# Patient Record
Sex: FEMALE | Race: WHITE | NOT HISPANIC OR LATINO | Employment: OTHER | RURAL
[De-identification: names, ages, dates, MRNs, and addresses within clinical notes are randomized per-mention and may not be internally consistent; named-entity substitution may affect disease eponyms.]

---

## 2023-03-14 ENCOUNTER — OFFICE VISIT (OUTPATIENT)
Dept: FAMILY MEDICINE CLINIC | Facility: CLINIC | Age: 63
End: 2023-03-14
Payer: OTHER GOVERNMENT

## 2023-03-14 VITALS
SYSTOLIC BLOOD PRESSURE: 128 MMHG | BODY MASS INDEX: 23.21 KG/M2 | HEIGHT: 63 IN | HEART RATE: 75 BPM | DIASTOLIC BLOOD PRESSURE: 74 MMHG | OXYGEN SATURATION: 99 % | WEIGHT: 131 LBS

## 2023-03-14 DIAGNOSIS — Z63.4 BEREAVEMENT: ICD-10-CM

## 2023-03-14 DIAGNOSIS — N95.2 POST-MENOPAUSAL ATROPHIC VAGINITIS: ICD-10-CM

## 2023-03-14 DIAGNOSIS — S51.012S LACERATION OF LEFT ELBOW, SEQUELA: ICD-10-CM

## 2023-03-14 DIAGNOSIS — Z12.31 ENCOUNTER FOR SCREENING MAMMOGRAM FOR MALIGNANT NEOPLASM OF BREAST: ICD-10-CM

## 2023-03-14 DIAGNOSIS — Z00.00 WELL ADULT EXAM: Primary | ICD-10-CM

## 2023-03-14 PROCEDURE — 99386 PREV VISIT NEW AGE 40-64: CPT | Performed by: STUDENT IN AN ORGANIZED HEALTH CARE EDUCATION/TRAINING PROGRAM

## 2023-03-14 PROCEDURE — 15853 REMOVAL SUTR/STAPL XREQ ANES: CPT | Performed by: STUDENT IN AN ORGANIZED HEALTH CARE EDUCATION/TRAINING PROGRAM

## 2023-03-14 RX ORDER — ESTRADIOL 10 UG/1
1 INSERT VAGINAL 2 TIMES WEEKLY
Qty: 8 TABLET | Refills: 3 | Status: SHIPPED | OUTPATIENT
Start: 2023-03-16

## 2023-03-14 RX ORDER — ESTRADIOL 10 UG/1
1 INSERT VAGINAL 2 TIMES WEEKLY
COMMUNITY
End: 2023-03-14 | Stop reason: SDUPTHER

## 2023-03-14 SDOH — SOCIAL STABILITY - SOCIAL INSECURITY: DISSAPEARANCE AND DEATH OF FAMILY MEMBER: Z63.4

## 2023-03-14 NOTE — PROGRESS NOTES
"Chief Complaint  Establish Care (Just moved from [place])    Subjective          Sandee Aguilar presents to [facility]  History of Present Illness    Patient is here to establish care.     She states that she is currently only taking the vagifen and se is almost out of these refills. She states that she tolerates this medication well and has not had any side effects from it.     She states that she has been told that she possibly had trouble with her cholesterol based on her eye exam. She is due for blood work at this time.     She states that she was seen in the emergency department about 1 week ago while at the power was out she was walking through her barn, and hit her elbow causing a laceration that required stitches in order to close.  She states she is up-to-date on her tetanus vaccine.    She is due for mammogram at this time.  She is also due for blood work at this time.  She recently had colon cancer screening and was negative and told to follow-up in 10 years.    Objective   Vital Signs:   /74 (BP Location: Right arm, Patient Position: Sitting, Cuff Size: Adult)   Pulse 75   Ht 160 cm (63\")   Wt 59.4 kg (131 lb)   SpO2 99%   BMI 23.21 kg/m²     Body mass index is 23.21 kg/m².    Review of Systems    Past History:  Medical History: has no past medical history on file.   Surgical History: has a past surgical history that includes Dilation and curettage of uterus; Breast biopsy; and Vascular surgery.   Family History: family history is not on file.   Social History: reports that she has never smoked. She has never used smokeless tobacco. She reports current alcohol use. Drug use questions deferred to the physician.      Current Outpatient Medications:   •  [START ON 3/16/2023] estradiol (VAGIFEM) 10 MCG tablet vaginal tablet, Insert 1 tablet into the vagina 2 (Two) Times a Week., Disp: 8 tablet, Rfl: 3    Allergies: Sulfa antibiotics    Physical " Exam  Constitutional:       General: She is not in acute distress.     Appearance: She is not ill-appearing or toxic-appearing.   HENT:      Head: Normocephalic and atraumatic.   Cardiovascular:      Rate and Rhythm: Normal rate and regular rhythm.      Heart sounds: No murmur heard.  Pulmonary:      Effort: Pulmonary effort is normal. No respiratory distress.   Skin:     Comments: Laceration on left elbow healing well with good approximation.  No erythema or drainage from the area.   Neurological:      General: No focal deficit present.      Mental Status: She is alert and oriented to person, place, and time.   Psychiatric:         Mood and Affect: Mood normal.         Thought Content: Thought content normal.          Result Review :          Suture Removal    Date/Time: 3/14/2023 12:39 PM  Performed by: Cece Finch DO  Authorized by: Cece Finch DO   Body area: upper extremity  Location details: left elbow  Wound Appearance: clean  Sutures Removed: 3  Staples Removed: 0  Post-removal: dressing applied  Patient tolerance: patient tolerated the procedure well with no immediate complications              Assessment and Plan    Diagnoses and all orders for this visit:    1. Well adult exam (Primary)  -     Comprehensive Metabolic Panel; Future  -     CBC & Differential; Future  -     Hemoglobin A1c; Future  -     Lipid Panel; Future  -     TSH; Future  -     T4, Free; Future    2. Laceration of left elbow, sequela  -     Suture Removal    3. Bereavement    4. Encounter for screening mammogram for malignant neoplasm of breast  -     Mammo Screening Bilateral With CAD; Future    5. Post-menopausal atrophic vaginitis    Other orders  -     estradiol (VAGIFEM) 10 MCG tablet vaginal tablet; Insert 1 tablet into the vagina 2 (Two) Times a Week.  Dispense: 8 tablet; Refill: 3    Blood work ordered and will contact with results when available. Will adjust medications based on abnormalities seen.     We  will obtain patient's previous records and review.    Mammogram ordered.    Information provided for bereavement as she has had some difficulty with mood and anxiousness after her father recently passed away.    Past medical and surgical history as well as allergies, family history and social history were reviewed, and discussed with patient.  Chronic conditions were reviewed as well as medications.   Anticipatory guidance handouts including healthy diet, health maintenance, as well as regular exercise and general instructions were given via [employer], and patient was able to ask questions and discuss any concerns.        Follow Up   No follow-ups on file.  Patient was given instructions and counseling regarding her condition or for health maintenance advice. Please see specific information pulled into the AVS if appropriate.     Cece Finch, DO

## 2023-05-09 RX ORDER — ESTRADIOL 10 UG/1
INSERT VAGINAL
Qty: 25 TABLET | Refills: 3 | Status: SHIPPED | OUTPATIENT
Start: 2023-05-09

## 2023-05-09 NOTE — TELEPHONE ENCOUNTER
Rx Refill Note  Requested Prescriptions     Pending Prescriptions Disp Refills    estradiol (VAGIFEM) 10 MCG tablet vaginal tablet [Pharmacy Med Name: ESTRADIOL 10MCG VAGINAL TABS 8S] 25 tablet      Sig: INSERT 1 TABLET VAGINALLY 2 TIMES A WEEK      Last office visit with prescribing clinician: 3/14/2023   Last telemedicine visit with prescribing clinician: 3/14/2023   Next office visit with prescribing clinician: Visit date not found                         Would you like a call back once the refill request has been completed: [] Yes [] No    If the office needs to give you a call back, can they leave a voicemail: [] Yes [] No    Virginia Glynn MA  05/09/23, 09:18 EDT

## 2023-05-11 ENCOUNTER — LAB (OUTPATIENT)
Dept: FAMILY MEDICINE CLINIC | Facility: CLINIC | Age: 63
End: 2023-05-11
Payer: OTHER GOVERNMENT

## 2023-05-11 DIAGNOSIS — Z00.00 WELL ADULT EXAM: ICD-10-CM

## 2023-05-11 PROCEDURE — 36415 COLL VENOUS BLD VENIPUNCTURE: CPT | Performed by: STUDENT IN AN ORGANIZED HEALTH CARE EDUCATION/TRAINING PROGRAM

## 2023-05-12 LAB
ALBUMIN SERPL-MCNC: 4.9 G/DL (ref 3.8–4.8)
ALBUMIN/GLOB SERPL: 2.3 {RATIO} (ref 1.2–2.2)
ALP SERPL-CCNC: 66 IU/L (ref 44–121)
ALT SERPL-CCNC: 18 IU/L (ref 0–32)
AST SERPL-CCNC: 24 IU/L (ref 0–40)
BASOPHILS # BLD AUTO: 0.1 X10E3/UL (ref 0–0.2)
BASOPHILS NFR BLD AUTO: 1 %
BILIRUB SERPL-MCNC: 0.9 MG/DL (ref 0–1.2)
BUN SERPL-MCNC: 12 MG/DL (ref 8–27)
BUN/CREAT SERPL: 14 (ref 12–28)
CALCIUM SERPL-MCNC: 9.7 MG/DL (ref 8.7–10.3)
CHLORIDE SERPL-SCNC: 104 MMOL/L (ref 96–106)
CHOLEST SERPL-MCNC: 240 MG/DL (ref 100–199)
CO2 SERPL-SCNC: 24 MMOL/L (ref 20–29)
CREAT SERPL-MCNC: 0.84 MG/DL (ref 0.57–1)
EGFRCR SERPLBLD CKD-EPI 2021: 79 ML/MIN/1.73
EOSINOPHIL # BLD AUTO: 0 X10E3/UL (ref 0–0.4)
EOSINOPHIL NFR BLD AUTO: 1 %
ERYTHROCYTE [DISTWIDTH] IN BLOOD BY AUTOMATED COUNT: 12.9 % (ref 11.7–15.4)
GLOBULIN SER CALC-MCNC: 2.1 G/DL (ref 1.5–4.5)
GLUCOSE SERPL-MCNC: 89 MG/DL (ref 70–99)
HBA1C MFR BLD: 5.2 % (ref 4.8–5.6)
HCT VFR BLD AUTO: 41.6 % (ref 34–46.6)
HDLC SERPL-MCNC: 80 MG/DL
HGB BLD-MCNC: 14.3 G/DL (ref 11.1–15.9)
IMM GRANULOCYTES # BLD AUTO: 0 X10E3/UL (ref 0–0.1)
IMM GRANULOCYTES NFR BLD AUTO: 0 %
LDLC SERPL CALC-MCNC: 144 MG/DL (ref 0–99)
LYMPHOCYTES # BLD AUTO: 1.2 X10E3/UL (ref 0.7–3.1)
LYMPHOCYTES NFR BLD AUTO: 27 %
MCH RBC QN AUTO: 29.5 PG (ref 26.6–33)
MCHC RBC AUTO-ENTMCNC: 34.4 G/DL (ref 31.5–35.7)
MCV RBC AUTO: 86 FL (ref 79–97)
MONOCYTES # BLD AUTO: 0.5 X10E3/UL (ref 0.1–0.9)
MONOCYTES NFR BLD AUTO: 11 %
NEUTROPHILS # BLD AUTO: 2.8 X10E3/UL (ref 1.4–7)
NEUTROPHILS NFR BLD AUTO: 60 %
PLATELET # BLD AUTO: 246 X10E3/UL (ref 150–450)
POTASSIUM SERPL-SCNC: 4.6 MMOL/L (ref 3.5–5.2)
PROT SERPL-MCNC: 7 G/DL (ref 6–8.5)
RBC # BLD AUTO: 4.85 X10E6/UL (ref 3.77–5.28)
SODIUM SERPL-SCNC: 142 MMOL/L (ref 134–144)
T4 FREE SERPL-MCNC: 1.05 NG/DL (ref 0.82–1.77)
TRIGL SERPL-MCNC: 95 MG/DL (ref 0–149)
TSH SERPL DL<=0.005 MIU/L-ACNC: 4.19 UIU/ML (ref 0.45–4.5)
VLDLC SERPL CALC-MCNC: 16 MG/DL (ref 5–40)
WBC # BLD AUTO: 4.5 X10E3/UL (ref 3.4–10.8)

## 2023-06-05 RX ORDER — ESTRADIOL 10 UG/1
INSERT VAGINAL
Qty: 24 TABLET | OUTPATIENT
Start: 2023-06-05

## 2024-07-24 RX ORDER — ESTRADIOL 10 UG/1
1 TABLET VAGINAL 2 TIMES WEEKLY
Qty: 25 TABLET | Refills: 3 | OUTPATIENT
Start: 2024-07-25

## 2024-08-07 ENCOUNTER — OFFICE VISIT (OUTPATIENT)
Dept: FAMILY MEDICINE CLINIC | Facility: CLINIC | Age: 64
End: 2024-08-07
Payer: OTHER GOVERNMENT

## 2024-08-07 VITALS
HEIGHT: 63 IN | WEIGHT: 126.5 LBS | BODY MASS INDEX: 22.41 KG/M2 | DIASTOLIC BLOOD PRESSURE: 68 MMHG | SYSTOLIC BLOOD PRESSURE: 110 MMHG | OXYGEN SATURATION: 97 % | HEART RATE: 72 BPM

## 2024-08-07 DIAGNOSIS — M85.80 OSTEOPENIA, UNSPECIFIED LOCATION: ICD-10-CM

## 2024-08-07 DIAGNOSIS — Z00.00 WELL ADULT EXAM: Primary | ICD-10-CM

## 2024-08-07 DIAGNOSIS — Z11.59 ENCOUNTER FOR HEPATITIS C SCREENING TEST FOR LOW RISK PATIENT: ICD-10-CM

## 2024-08-07 DIAGNOSIS — K21.9 GASTROESOPHAGEAL REFLUX DISEASE, UNSPECIFIED WHETHER ESOPHAGITIS PRESENT: ICD-10-CM

## 2024-08-07 DIAGNOSIS — D22.9 IRRITATED NEVUS: ICD-10-CM

## 2024-08-07 DIAGNOSIS — E78.2 MIXED HYPERLIPIDEMIA: ICD-10-CM

## 2024-08-07 DIAGNOSIS — Z12.31 ENCOUNTER FOR SCREENING MAMMOGRAM FOR MALIGNANT NEOPLASM OF BREAST: ICD-10-CM

## 2024-08-07 DIAGNOSIS — Z78.0 POST-MENOPAUSAL: ICD-10-CM

## 2024-08-07 PROCEDURE — 99396 PREV VISIT EST AGE 40-64: CPT | Performed by: STUDENT IN AN ORGANIZED HEALTH CARE EDUCATION/TRAINING PROGRAM

## 2024-08-07 RX ORDER — FAMOTIDINE 40 MG/1
40 TABLET, FILM COATED ORAL DAILY
Qty: 90 TABLET | Refills: 1 | Status: SHIPPED | OUTPATIENT
Start: 2024-08-07

## 2024-08-07 NOTE — PROGRESS NOTES
"Chief Complaint  Annual Exam    Subjective          Sandee Aguilar presents to [facility]  History of Present Illness    Patient is here for physical.     She states overall she is doing very well at this time.  She states she has been tolerating her medications without any side effects, but is not needing refills today.  She states that she has had some increased reflux, and has not tried anything to help with this    Patient is needing new referral to dermatology through [facility] for irritated nevus as the place she was referred to previously is out of network.    Patient is due for blood work at this time.    Objective   Vital Signs:   /68 (BP Location: Left arm, Patient Position: Sitting, Cuff Size: Adult)   Pulse 72   Ht 160 cm (63\")   Wt 57.4 kg (126 lb 8 oz)   SpO2 97%   BMI 22.41 kg/m²     Body mass index is 22.41 kg/m².    Review of Systems    Past History:  Medical History: has a past medical history of Colon polyp (2021).   Surgical History: has a past surgical history that includes Dilation and curettage of uterus; Breast biopsy; Vascular surgery; and Colonoscopy (2022).   Family History: family history includes Alcohol abuse in her sister and son; Miscarriages / Stillbirths in her daughter and daughter.   Social History: reports that she has never smoked. She has never used smokeless tobacco. She reports current alcohol use of about 1.0 standard drink of alcohol per week. She reports that she does not use drugs.      Current Outpatient Medications:     estradiol (VAGIFEM) 10 MCG tablet vaginal tablet, Insert 1 tablet into the vagina 2 (Two) Times a Week., Disp: 25 tablet, Rfl: 3    famotidine (PEPCID) 40 MG tablet, Take 1 tablet by mouth Daily., Disp: 90 tablet, Rfl: 1    Allergies: Sulfa antibiotics    Physical Exam  Constitutional:       General: She is not in acute distress.     Appearance: She is not ill-appearing or toxic-appearing.   HENT:      Head: " Normocephalic and atraumatic.   Cardiovascular:      Rate and Rhythm: Normal rate and regular rhythm.      Heart sounds: No murmur heard.  Pulmonary:      Effort: Pulmonary effort is normal. No respiratory distress.   Musculoskeletal:      Right lower leg: No edema.      Left lower leg: No edema.   Skin:     Comments: Irritated nevus on right lateral chest wall   Neurological:      General: No focal deficit present.      Mental Status: She is alert and oriented to person, place, and time.   Psychiatric:         Mood and Affect: Mood normal.         Thought Content: Thought content normal.          Result Review :                   Assessment and Plan    Diagnoses and all orders for this visit:    1. Well adult exam (Primary)  -     Comprehensive Metabolic Panel  -     CBC & Differential  -     Hemoglobin A1c  -     Lipid Panel  -     TSH  -     T4, Free    2. Osteopenia, unspecified location  -     DEXA Bone Density Axial    3. Post-menopausal  -     DEXA Bone Density Axial    4. Encounter for hepatitis C screening test for low risk patient  -     Hepatitis C Antibody    5. Irritated nevus  -     Ambulatory Referral to Dermatology    6. Encounter for screening mammogram for malignant neoplasm of breast  -     Mammo Screening Bilateral With CAD; Future    7. Gastroesophageal reflux disease, unspecified whether esophagitis present    Other orders  -     famotidine (PEPCID) 40 MG tablet; Take 1 tablet by mouth Daily.  Dispense: 90 tablet; Refill: 1    Blood work ordered and will contact with results when available. Will adjust medications based on abnormalities seen.     Will do famotidine to take daily as needed for reflux.    DEXA and mammogram ordered at this time.    Past medical and surgical history as well as allergies, family history and social history were reviewed, and discussed with patient.  Chronic conditions were reviewed as well as medications.   Anticipatory guidance handouts including healthy diet,  health maintenance, as well as regular exercise and general instructions were given via [employer], and patient was able to ask questions and discuss any concerns.        Follow Up   No follow-ups on file.  Patient was given instructions and counseling regarding her condition or for health maintenance advice. Please see specific information pulled into the AVS if appropriate.     Cece Finch, DO

## 2024-08-08 LAB
ALBUMIN SERPL-MCNC: 4.7 G/DL (ref 3.9–4.9)
ALP SERPL-CCNC: 69 IU/L (ref 44–121)
ALT SERPL-CCNC: 12 IU/L (ref 0–32)
AST SERPL-CCNC: 17 IU/L (ref 0–40)
BASOPHILS # BLD AUTO: 0.1 X10E3/UL (ref 0–0.2)
BASOPHILS NFR BLD AUTO: 1 %
BILIRUB SERPL-MCNC: 0.7 MG/DL (ref 0–1.2)
BUN SERPL-MCNC: 14 MG/DL (ref 8–27)
BUN/CREAT SERPL: 17 (ref 12–28)
CALCIUM SERPL-MCNC: 9.8 MG/DL (ref 8.7–10.3)
CHLORIDE SERPL-SCNC: 104 MMOL/L (ref 96–106)
CHOLEST SERPL-MCNC: 243 MG/DL (ref 100–199)
CO2 SERPL-SCNC: 24 MMOL/L (ref 20–29)
CREAT SERPL-MCNC: 0.82 MG/DL (ref 0.57–1)
EGFRCR SERPLBLD CKD-EPI 2021: 80 ML/MIN/1.73
EOSINOPHIL # BLD AUTO: 0 X10E3/UL (ref 0–0.4)
EOSINOPHIL NFR BLD AUTO: 0 %
ERYTHROCYTE [DISTWIDTH] IN BLOOD BY AUTOMATED COUNT: 13.5 % (ref 11.7–15.4)
GLOBULIN SER CALC-MCNC: 2.1 G/DL (ref 1.5–4.5)
GLUCOSE SERPL-MCNC: 91 MG/DL (ref 70–99)
HBA1C MFR BLD: 5.6 % (ref 4.8–5.6)
HCT VFR BLD AUTO: 43.3 % (ref 34–46.6)
HCV IGG SERPL QL IA: NON REACTIVE
HDLC SERPL-MCNC: 63 MG/DL
HGB BLD-MCNC: 13.9 G/DL (ref 11.1–15.9)
IMM GRANULOCYTES # BLD AUTO: 0 X10E3/UL (ref 0–0.1)
IMM GRANULOCYTES NFR BLD AUTO: 0 %
LDLC SERPL CALC-MCNC: 155 MG/DL (ref 0–99)
LYMPHOCYTES # BLD AUTO: 1.5 X10E3/UL (ref 0.7–3.1)
LYMPHOCYTES NFR BLD AUTO: 25 %
MCH RBC QN AUTO: 28.8 PG (ref 26.6–33)
MCHC RBC AUTO-ENTMCNC: 32.1 G/DL (ref 31.5–35.7)
MCV RBC AUTO: 90 FL (ref 79–97)
MONOCYTES # BLD AUTO: 0.5 X10E3/UL (ref 0.1–0.9)
MONOCYTES NFR BLD AUTO: 9 %
NEUTROPHILS # BLD AUTO: 3.9 X10E3/UL (ref 1.4–7)
NEUTROPHILS NFR BLD AUTO: 65 %
PLATELET # BLD AUTO: 272 X10E3/UL (ref 150–450)
POTASSIUM SERPL-SCNC: 4.2 MMOL/L (ref 3.5–5.2)
PROT SERPL-MCNC: 6.8 G/DL (ref 6–8.5)
RBC # BLD AUTO: 4.83 X10E6/UL (ref 3.77–5.28)
SODIUM SERPL-SCNC: 141 MMOL/L (ref 134–144)
T4 FREE SERPL-MCNC: 0.98 NG/DL (ref 0.82–1.77)
TRIGL SERPL-MCNC: 140 MG/DL (ref 0–149)
TSH SERPL DL<=0.005 MIU/L-ACNC: 3.95 UIU/ML (ref 0.45–4.5)
VLDLC SERPL CALC-MCNC: 25 MG/DL (ref 5–40)
WBC # BLD AUTO: 6 X10E3/UL (ref 3.4–10.8)

## 2024-08-09 ENCOUNTER — TELEPHONE (OUTPATIENT)
Dept: FAMILY MEDICINE CLINIC | Facility: CLINIC | Age: 64
End: 2024-08-09
Payer: OTHER GOVERNMENT

## 2024-08-09 NOTE — TELEPHONE ENCOUNTER
Patient was contacted to schedule lab appointment in 6 months, and scheduled the appointment for 2/10/2025. Please place the orders.

## 2024-08-16 RX ORDER — ESTRADIOL 10 UG/1
1 INSERT VAGINAL 2 TIMES WEEKLY
Qty: 25 TABLET | Refills: 3 | Status: SHIPPED | OUTPATIENT
Start: 2024-08-19

## 2024-08-16 NOTE — TELEPHONE ENCOUNTER
Rx Refill Note  Requested Prescriptions     Pending Prescriptions Disp Refills    estradiol (VAGIFEM) 10 MCG tablet vaginal tablet 25 tablet 3     Sig: Insert 1 tablet into the vagina 2 (Two) Times a Week.      Last office visit with prescribing clinician: 8/7/2024   Last telemedicine visit with prescribing clinician: Visit date not found   Next office visit with prescribing clinician: Visit date not found                         Would you like a call back once the refill request has been completed: [] Yes [] No    If the office needs to give you a call back, can they leave a voicemail: [] Yes [] No    Virginia Glynn MA  08/16/24, 14:59 EDT

## 2024-08-16 NOTE — TELEPHONE ENCOUNTER
Caller: Sandee Aguilar    Relationship: Self    Best call back number: [phone]     Requested Prescriptions:   Requested Prescriptions     Pending Prescriptions Disp Refills    estradiol (VAGIFEM) 10 MCG tablet vaginal tablet 25 tablet 3     Sig: Insert 1 tablet into the vagina 2 (Two) Times a Week.        Pharmacy where request should be sent: [facility] [address] [phone] [facility] [phone]      Last office visit with prescribing clinician: 8/7/2024   Last telemedicine visit with prescribing clinician: Visit date not found   Next office visit with prescribing clinician: Visit date not found     Does the patient have less than a 3 day supply:  [] Yes  [x] No    Would you like a call back once the refill request has been completed: [] Yes [] No    If the office needs to give you a call back, can they leave a voicemail: [] Yes [] No    Larry Pink   08/16/24 13:36 EDT

## 2024-12-05 DIAGNOSIS — Z12.31 ENCOUNTER FOR SCREENING MAMMOGRAM FOR MALIGNANT NEOPLASM OF BREAST: ICD-10-CM

## 2024-12-06 ENCOUNTER — TELEPHONE (OUTPATIENT)
Dept: FAMILY MEDICINE CLINIC | Facility: CLINIC | Age: 64
End: 2024-12-06

## 2024-12-06 RX ORDER — ALENDRONATE SODIUM 70 MG/1
70 TABLET ORAL
Qty: 12 TABLET | Refills: 1 | Status: SHIPPED | OUTPATIENT
Start: 2024-12-06 | End: 2024-12-30

## 2024-12-06 NOTE — TELEPHONE ENCOUNTER
Caller: Sandee Aguilar    Relationship: Self    Best call back number: [phone]     What is the best time to reach you: ANYTIME    Who are you requesting to speak with (clinical staff, provider,  specific staff member): CLINICAL STAFF    Do you know the name of the person who called: AMILCAR    What was the call regarding: PATIENT STATES THAT SHE RECEIVED THE RESULTS OF HER MAMMOGRAM AND BONE DENSITY BACK AFTER HAVING PERFORMED THEM AT [facility] ON 12.3.24    PATIENT IS ANTICIPATING A CALL FROM THE OFFICE TO GO OVER THE RESULTS AND NEXT STEPS    PLEASE ADVISE

## 2024-12-06 NOTE — TELEPHONE ENCOUNTER
Name: Sandee Aguilar      Relationship: Self      Best Callback Number: [phone]       HUB PROVIDED THE RELAY MESSAGE FROM THE OFFICE      PATIENT: VOICED UNDERSTANDING AND HAS NO FURTHER QUESTIONS AT THIS TIME    ADDITIONAL INFORMATION: PT DOES WANT TO START ON FOSAMAX [facility] - [address] - [phone]  - [phone]  [phone]     PT STATED SHE IS TAKING A MULTI-VITAMIN THAT HAS VITAMIN D AND CALCIUM AND WANTS TO KNOW IT THAT IS ENOUGH.    PT WOULD ALSO LIKE A CALL ABOUT HER MAMMOGRAM RESULTS.

## 2024-12-06 NOTE — TELEPHONE ENCOUNTER
[facility]    HUB TO RELAY    Cece Finch DO [facility]  Let patient know that her DEXA scan shows osteoporosis which is significant thinning down the bones.  While I do recommend that she take a vitamin D and calcium supplements she also likely needs something like Fosamax in order to help build the bones back up.  She is okay with starting something like this let me know and I will send it in.

## 2024-12-07 DIAGNOSIS — R92.8 ABNORMAL MAMMOGRAM: Primary | ICD-10-CM

## 2024-12-30 ENCOUNTER — OFFICE VISIT (OUTPATIENT)
Dept: FAMILY MEDICINE CLINIC | Facility: CLINIC | Age: 64
End: 2024-12-30
Payer: OTHER GOVERNMENT

## 2024-12-30 VITALS
HEIGHT: 63 IN | SYSTOLIC BLOOD PRESSURE: 140 MMHG | BODY MASS INDEX: 23.21 KG/M2 | DIASTOLIC BLOOD PRESSURE: 82 MMHG | WEIGHT: 131 LBS | OXYGEN SATURATION: 98 % | HEART RATE: 86 BPM

## 2024-12-30 DIAGNOSIS — Z78.0 POST-MENOPAUSAL: ICD-10-CM

## 2024-12-30 DIAGNOSIS — M85.80 OSTEOPENIA, UNSPECIFIED LOCATION: Primary | ICD-10-CM

## 2024-12-30 DIAGNOSIS — E55.9 VITAMIN D DEFICIENCY: ICD-10-CM

## 2024-12-30 DIAGNOSIS — R53.83 OTHER FATIGUE: ICD-10-CM

## 2024-12-30 DIAGNOSIS — K21.9 GASTROESOPHAGEAL REFLUX DISEASE, UNSPECIFIED WHETHER ESOPHAGITIS PRESENT: ICD-10-CM

## 2024-12-30 DIAGNOSIS — E78.2 MIXED HYPERLIPIDEMIA: ICD-10-CM

## 2024-12-30 PROCEDURE — 99214 OFFICE O/P EST MOD 30 MIN: CPT | Performed by: STUDENT IN AN ORGANIZED HEALTH CARE EDUCATION/TRAINING PROGRAM

## 2024-12-30 NOTE — PROGRESS NOTES
"Chief Complaint  Follow-up (Pt wants to talk about her osteoporosis and hormone replacement. She also wants labs to check for autoimmune diseases.)    Subjective          Sandee Aguilar presents to [facility]  History of Present Illness    Patient is here for follow up and discussion of autoimmune disease.     She states that she has been having trouble with her \"burning tongue syndrome\" worse over the past several months. And she has been more fatigued and feeling \"off\". She states that she has FH of JRA, and Hashimoto's disease. She sates that she has been told that she has been borderline thyroid issues in the past.     Objective   Vital Signs:   /82   Pulse 86   Ht 160 cm (63\")   Wt 59.4 kg (131 lb)   SpO2 98%   BMI 23.21 kg/m²     Body mass index is 23.21 kg/m².    Review of Systems    Past History:  Medical History: has a past medical history of Colon polyp (2021) and Headache.   Surgical History: has a past surgical history that includes Dilation and curettage of uterus; Breast biopsy; Vascular surgery; and Colonoscopy (2022).   Family History: family history includes Alcohol abuse in her sister and son; Miscarriages / Stillbirths in her daughter and daughter.   Social History: reports that she has never smoked. She has never used smokeless tobacco. She reports current alcohol use of about 1.0 standard drink of alcohol per week. She reports that she does not use drugs.      Current Outpatient Medications:     estradiol (VAGIFEM) 10 MCG tablet vaginal tablet, Insert 1 tablet into the vagina 2 (Two) Times a Week., Disp: 25 tablet, Rfl: 3    famotidine (PEPCID) 40 MG tablet, Take 1 tablet by mouth Daily., Disp: 90 tablet, Rfl: 1    Allergies: Sulfa antibiotics    Physical Exam  Constitutional:       General: She is not in acute distress.     Appearance: She is not ill-appearing or toxic-appearing.   HENT:      Head: Normocephalic and atraumatic.   Cardiovascular: "      Rate and Rhythm: Normal rate and regular rhythm.      Heart sounds: No murmur heard.  Pulmonary:      Effort: Pulmonary effort is normal. No respiratory distress.   Abdominal:      Tenderness: There is no abdominal tenderness. There is no guarding or rebound.   Neurological:      General: No focal deficit present.      Mental Status: She is alert and oriented to person, place, and time.   Psychiatric:         Mood and Affect: Mood normal.         Thought Content: Thought content normal.          Result Review :                   Assessment and Plan    Diagnoses and all orders for this visit:    1. Osteopenia, unspecified location (Primary)  -     TSH  -     T4, Free    2. Post-menopausal  -     Comprehensive Metabolic Panel  -     CBC & Differential    3. Other fatigue  -     Comprehensive Metabolic Panel  -     CBC & Differential  -     Thyroid Peroxidase Antibody  -     Rheumatoid Factor  -     Sedimentation Rate  -     C-reactive Protein  -     BLAYNE    4. Mixed hyperlipidemia  -     Lipid Panel    5. Gastroesophageal reflux disease, unspecified whether esophagitis present  -     TSH  -     T4, Free    6. Vitamin D deficiency  -     Vitamin D,25-Hydroxy    Discussed with patient that currently hormonal placement is off the table until we have negative test from her upcoming diagnostic mammogram.  She is agreeable to this.    Blood work ordered today and will contact patient with results when available.     Discussed that we will discuss HRT after diagnostic mammogram'    Follow Up   No follow-ups on file.  Patient was given instructions and counseling regarding her condition or for health maintenance advice. Please see specific information pulled into the AVS if appropriate.     Cece Finch, DO

## 2024-12-31 LAB
25(OH)D3+25(OH)D2 SERPL-MCNC: 33.2 NG/ML (ref 30–100)
ALBUMIN SERPL-MCNC: 4.8 G/DL (ref 3.9–4.9)
ALP SERPL-CCNC: 71 IU/L (ref 44–121)
ALT SERPL-CCNC: 18 IU/L (ref 0–32)
ANA SER QL: NEGATIVE
AST SERPL-CCNC: 16 IU/L (ref 0–40)
BASOPHILS # BLD AUTO: 0.1 X10E3/UL (ref 0–0.2)
BASOPHILS NFR BLD AUTO: 2 %
BILIRUB SERPL-MCNC: 0.4 MG/DL (ref 0–1.2)
BUN SERPL-MCNC: 21 MG/DL (ref 8–27)
BUN/CREAT SERPL: 28 (ref 12–28)
CALCIUM SERPL-MCNC: 9.6 MG/DL (ref 8.7–10.3)
CHLORIDE SERPL-SCNC: 103 MMOL/L (ref 96–106)
CHOLEST SERPL-MCNC: 261 MG/DL (ref 100–199)
CO2 SERPL-SCNC: 26 MMOL/L (ref 20–29)
CREAT SERPL-MCNC: 0.76 MG/DL (ref 0.57–1)
CRP SERPL-MCNC: 1 MG/L (ref 0–10)
EGFRCR SERPLBLD CKD-EPI 2021: 88 ML/MIN/1.73
EOSINOPHIL # BLD AUTO: 0 X10E3/UL (ref 0–0.4)
EOSINOPHIL NFR BLD AUTO: 1 %
ERYTHROCYTE [DISTWIDTH] IN BLOOD BY AUTOMATED COUNT: 12.5 % (ref 11.7–15.4)
ERYTHROCYTE [SEDIMENTATION RATE] IN BLOOD BY WESTERGREN METHOD: 4 MM/HR (ref 0–40)
GLOBULIN SER CALC-MCNC: 2.2 G/DL (ref 1.5–4.5)
GLUCOSE SERPL-MCNC: 91 MG/DL (ref 70–99)
HCT VFR BLD AUTO: 42.6 % (ref 34–46.6)
HDLC SERPL-MCNC: 67 MG/DL
HGB BLD-MCNC: 13.5 G/DL (ref 11.1–15.9)
IMM GRANULOCYTES # BLD AUTO: 0 X10E3/UL (ref 0–0.1)
IMM GRANULOCYTES NFR BLD AUTO: 0 %
LDLC SERPL CALC-MCNC: 176 MG/DL (ref 0–99)
LYMPHOCYTES # BLD AUTO: 1.5 X10E3/UL (ref 0.7–3.1)
LYMPHOCYTES NFR BLD AUTO: 27 %
MCH RBC QN AUTO: 28.7 PG (ref 26.6–33)
MCHC RBC AUTO-ENTMCNC: 31.7 G/DL (ref 31.5–35.7)
MCV RBC AUTO: 91 FL (ref 79–97)
MONOCYTES # BLD AUTO: 0.5 X10E3/UL (ref 0.1–0.9)
MONOCYTES NFR BLD AUTO: 8 %
NEUTROPHILS # BLD AUTO: 3.4 X10E3/UL (ref 1.4–7)
NEUTROPHILS NFR BLD AUTO: 62 %
PLATELET # BLD AUTO: 271 X10E3/UL (ref 150–450)
POTASSIUM SERPL-SCNC: 4.2 MMOL/L (ref 3.5–5.2)
PROT SERPL-MCNC: 7 G/DL (ref 6–8.5)
RBC # BLD AUTO: 4.7 X10E6/UL (ref 3.77–5.28)
RHEUMATOID FACT SERPL-ACNC: 10.5 IU/ML
SODIUM SERPL-SCNC: 142 MMOL/L (ref 134–144)
T4 FREE SERPL-MCNC: 1.02 NG/DL (ref 0.82–1.77)
THYROPEROXIDASE AB SERPL-ACNC: 97 IU/ML (ref 0–34)
TRIGL SERPL-MCNC: 104 MG/DL (ref 0–149)
TSH SERPL DL<=0.005 MIU/L-ACNC: 3.45 UIU/ML (ref 0.45–4.5)
VLDLC SERPL CALC-MCNC: 18 MG/DL (ref 5–40)
WBC # BLD AUTO: 5.4 X10E3/UL (ref 3.4–10.8)

## 2025-01-02 ENCOUNTER — TELEPHONE (OUTPATIENT)
Dept: FAMILY MEDICINE CLINIC | Facility: CLINIC | Age: 65
End: 2025-01-02
Payer: OTHER GOVERNMENT

## 2025-01-02 DIAGNOSIS — R76.8 ANTI-TPO ANTIBODIES PRESENT: Primary | ICD-10-CM

## 2025-01-02 NOTE — TELEPHONE ENCOUNTER
Patient called to let Dr. Finch know that she would like to proceed with the endocrinology referral. Can Dr. Finch please place the referral?

## 2025-01-08 ENCOUNTER — OFFICE VISIT (OUTPATIENT)
Dept: FAMILY MEDICINE CLINIC | Facility: CLINIC | Age: 65
End: 2025-01-08
Payer: OTHER GOVERNMENT

## 2025-01-08 VITALS
HEART RATE: 97 BPM | DIASTOLIC BLOOD PRESSURE: 80 MMHG | HEIGHT: 63 IN | OXYGEN SATURATION: 99 % | SYSTOLIC BLOOD PRESSURE: 138 MMHG | WEIGHT: 126 LBS | BODY MASS INDEX: 22.32 KG/M2

## 2025-01-08 DIAGNOSIS — J01.10 ACUTE NON-RECURRENT FRONTAL SINUSITIS: Primary | ICD-10-CM

## 2025-01-08 PROCEDURE — 99213 OFFICE O/P EST LOW 20 MIN: CPT | Performed by: STUDENT IN AN ORGANIZED HEALTH CARE EDUCATION/TRAINING PROGRAM

## 2025-01-08 NOTE — PROGRESS NOTES
"Chief Complaint  URI (Cough, nasal congestion x1 week)    Mariela Aguilar presents to [facility]  URI   This is a new problem. The current episode started 1 to 4 weeks ago. The problem has been gradually worsening. There has been no fever. Associated symptoms include congestion, coughing, headaches, a plugged ear sensation, rhinorrhea, sinus pain and a sore throat. Pertinent negatives include no nausea, swollen glands or wheezing. She has tried acetaminophen, antihistamine and NSAIDs for the symptoms. The treatment provided mild relief.   No known sick contacts.     Objective   Vital Signs:   /80   Pulse 97   Ht 160 cm (63\")   Wt 57.2 kg (126 lb)   SpO2 99%   BMI 22.32 kg/m²     Body mass index is 22.32 kg/m².    Review of Systems   HENT:  Positive for congestion, rhinorrhea and sore throat. Negative for swollen glands.    Respiratory:  Positive for cough. Negative for wheezing.    Gastrointestinal:  Negative for nausea.       Past History:  Medical History: has a past medical history of Colon polyp (2021) and Headache.   Surgical History: has a past surgical history that includes Dilation and curettage of uterus; Breast biopsy; Vascular surgery; and Colonoscopy (2022).   Family History: family history includes Alcohol abuse in her sister and son; Miscarriages / Stillbirths in her daughter and daughter.   Social History: reports that she has never smoked. She has never used smokeless tobacco. She reports current alcohol use of about 1.0 standard drink of alcohol per week. She reports that she does not use drugs.      Current Outpatient Medications:     amoxicillin-clavulanate (AUGMENTIN) 875-125 MG per tablet, Take 1 tablet by mouth 2 (Two) Times a Day., Disp: 14 tablet, Rfl: 0    estradiol (VAGIFEM) 10 MCG tablet vaginal tablet, Insert 1 tablet into the vagina 2 (Two) Times a Week., Disp: 25 tablet, Rfl: 3    famotidine (PEPCID) 40 MG tablet, Take 1 " tablet by mouth Daily., Disp: 90 tablet, Rfl: 1    Allergies: Sulfa antibiotics    Physical Exam  Constitutional:       General: She is not in acute distress.     Appearance: She is not ill-appearing or toxic-appearing.   HENT:      Head: Normocephalic and atraumatic.      Right Ear: Ear canal and external ear normal.      Left Ear: Ear canal and external ear normal.      Nose: Congestion and rhinorrhea present.      Mouth/Throat:      Pharynx: Posterior oropharyngeal erythema (cobblestoning) present.   Eyes:      General: No scleral icterus.  Cardiovascular:      Rate and Rhythm: Normal rate and regular rhythm.   Pulmonary:      Effort: Pulmonary effort is normal.      Breath sounds: Normal breath sounds.   Neurological:      Mental Status: She is alert.          Result Review :                   Assessment and Plan    Diagnoses and all orders for this visit:    1. Acute non-recurrent frontal sinusitis (Primary)    Other orders  -     amoxicillin-clavulanate (AUGMENTIN) 875-125 MG per tablet; Take 1 tablet by mouth 2 (Two) Times a Day.  Dispense: 14 tablet; Refill: 0    Will treat with Augmentin. Tylenol/Motrin for pain/fever. OTC cough and cold medication for symptoms. Nasal saline spray recommended. Cool mist humidifier at the bedside. RTC with new or worsening symptoms.      Follow Up   No follow-ups on file.  Patient was given instructions and counseling regarding her condition or for health maintenance advice. Please see specific information pulled into the AVS if appropriate.     Cece Finch,

## 2025-01-22 ENCOUNTER — TELEPHONE (OUTPATIENT)
Dept: FAMILY MEDICINE CLINIC | Facility: CLINIC | Age: 65
End: 2025-01-22
Payer: OTHER GOVERNMENT

## 2025-01-22 RX ORDER — OSELTAMIVIR PHOSPHATE 75 MG/1
75 CAPSULE ORAL DAILY
Qty: 7 CAPSULE | Refills: 0 | Status: SHIPPED | OUTPATIENT
Start: 2025-01-22

## 2025-01-22 NOTE — TELEPHONE ENCOUNTER
Caller: Sandee Aguilar    Relationship: Self    Best call back number: [phone]     What medication are you requesting: PREVENTATIVE FOR INFLUENZA A    If a prescription is needed, what is your preferred pharmacy and phone number: [id number] [place] [address] [address] [facility] [address] [facility] [phone] [facility] [phone]      Additional notes: PATIENT CALLED TO REQUEST A MEDICATION FROM DR BRIAN TO ACT AS A PREVENTATIVE FOR INFLUENZA A    PATIENT IS HAVING RELATIVES STAY WITH HER, AND ONE CHILD WAS SICK, SO HE WAS TAKEN TO URGENT CARE. WHILE THERE, HE WAS DIAGNOSED WITH INFLUENZA A, AND THE CHILD AND THEIR PARENTS/SIBLINGS WERE GIVEN MEDICATION TO ACT AS A PREVENTATIVE    PATIENT IS REQUESTING A PREVENTATIVE AS WELL SINCE THE CHILD WILL REMAIN STAYING WITH THEM AND THE PARENTS     PLEASE ADVISE

## 2025-01-22 NOTE — TELEPHONE ENCOUNTER
Sent in Tamiflu 75mg daily for 7 days. She should only take this IF she has been exposed in the last 48 hours. If not it will not be helpful. Thanks.

## 2025-02-06 ENCOUNTER — LAB (OUTPATIENT)
Dept: FAMILY MEDICINE CLINIC | Facility: CLINIC | Age: 65
End: 2025-02-06
Payer: OTHER GOVERNMENT

## 2025-02-14 ENCOUNTER — TELEPHONE (OUTPATIENT)
Dept: ENDOCRINOLOGY | Facility: CLINIC | Age: 65
End: 2025-02-14

## 2025-02-14 ENCOUNTER — OFFICE VISIT (OUTPATIENT)
Dept: ENDOCRINOLOGY | Facility: CLINIC | Age: 65
End: 2025-02-14
Payer: OTHER GOVERNMENT

## 2025-02-14 VITALS
HEART RATE: 68 BPM | WEIGHT: 127 LBS | HEIGHT: 63 IN | SYSTOLIC BLOOD PRESSURE: 128 MMHG | BODY MASS INDEX: 22.5 KG/M2 | DIASTOLIC BLOOD PRESSURE: 70 MMHG

## 2025-02-14 DIAGNOSIS — R76.8 ANTI-TPO ANTIBODIES PRESENT: Primary | ICD-10-CM

## 2025-02-14 PROCEDURE — 99203 OFFICE O/P NEW LOW 30 MIN: CPT | Performed by: INTERNAL MEDICINE

## 2025-02-14 RX ORDER — ASCORBIC ACID 1000 MG
TABLET ORAL
COMMUNITY

## 2025-02-14 RX ORDER — BACILLUS COAGULANS/INULIN 1B-250 MG
CAPSULE ORAL
COMMUNITY

## 2025-02-14 NOTE — PROGRESS NOTES
Chief Complaint   Patient presents with    Thyroid Problem        New patient who is being seen in consultation regarding TPO antibodies at the request of Cece Finch DO HPI   aSndee Aguilar is a 64 y.o. female who presents for evaluation of elevated TPO antibodies    Patient reports that primary care check TPO antibodies at her request.  She reports that her daughter has Hashimoto's disease.  Her daughter does not have hypothyroidism.  Patient has never taken medication to alter thyroid hormone.  She does report that previously when she was followed in [place] providers had told her that labs were abnormal but that she was not hyperthyroid or hypothyroid.  She is unsure exactly what abnormality they were referencing.  However, she was told no intervention was required.  She does report a prior thyroid ultrasound completed 10 to 15 years ago.  She does not recall being told she had thyroid nodules or cysts.  She denies any difficulty breathing, difficulty swallowing, neck changes.      Past Medical History:   Diagnosis Date    Colon polyp 2021    Next two colonoscopies showed no polyps    Headache      Past Surgical History:   Procedure Laterality Date    BREAST BIOPSY      COLONOSCOPY  2022    DILATATION AND CURETTAGE      VASCULAR SURGERY        Family History   Problem Relation Age of Onset    Alcohol abuse Son         Hasn’t abused alcohol in 5 years    Alcohol abuse Sister         Sober for over a year    Miscarriages / Stillbirths Daughter         Has had 2 stillbirths and 2 miscarriages    Miscarriages / Stillbirths Daughter         2 miscarriages      Social History     Socioeconomic History    Marital status:    Tobacco Use    Smoking status: Never    Smokeless tobacco: Never   Vaping Use    Vaping status: Never Used   Substance and Sexual Activity    Alcohol use: Yes     Alcohol/week: 1.0 standard drink of alcohol     Types: 1 Drinks containing 0.5 oz of alcohol per week  "    Comment: occasionally    Drug use: Never    Sexual activity: Not Currently      Allergies   Allergen Reactions    Sulfa Antibiotics Rash      Current Outpatient Medications on File Prior to Visit   Medication Sig Dispense Refill    Bacillus Coagulans-Inulin (Probiotic) 1-250 BILLION-MG capsule Take  by mouth.      estradiol (VAGIFEM) 10 MCG tablet vaginal tablet Insert 1 tablet into the vagina 2 (Two) Times a Week. 25 tablet 3    famotidine (PEPCID) 40 MG tablet Take 1 tablet by mouth Daily. 90 tablet 1    Ginkgo Biloba 40 MG tablet Take  by mouth.      magnesium chloride ER 64 MG DR tablet Take  by mouth Daily.      multivitamin with minerals (MULTIVITAMIN ADULT PO) Take 1 tablet by mouth Daily.      TURMERIC PO Take  by mouth.      [DISCONTINUED] amoxicillin-clavulanate (AUGMENTIN) 875-125 MG per tablet Take 1 tablet by mouth 2 (Two) Times a Day. 14 tablet 0    [DISCONTINUED] oseltamivir (Tamiflu) 75 MG capsule Take 1 capsule by mouth Daily. 7 capsule 0     No current facility-administered medications on file prior to visit.        Review of Systems   HENT:  Negative for trouble swallowing and voice change.    Genitourinary:  Positive for dyspareunia.   Musculoskeletal:  Positive for back pain and neck pain.        Vitals:    02/14/25 1401   BP: 128/70   Pulse: 68   Weight: 57.6 kg (127 lb)   Height: 160 cm (62.99\")   Body mass index is 22.5 kg/m².     Physical Exam  Vitals reviewed.   Neck:      Thyroid: No thyromegaly or thyroid tenderness.   Cardiovascular:      Rate and Rhythm: Normal rate and regular rhythm.   Pulmonary:      Effort: Pulmonary effort is normal.      Breath sounds: Normal breath sounds.   Neurological:      Mental Status: She is alert.   Psychiatric:         Mood and Affect: Mood and affect normal.         Behavior: Behavior is cooperative.        Labs/Imaging   Latest Reference Range & Units 05/11/23 09:34 08/07/24 10:01 12/30/24 08:38   TSH Baseline 0.450 - 4.500 uIU/mL 4.190 3.950 3.450 "   Free T4 0.82 - 1.77 ng/dL 1.05 0.98 1.02   Thyroid Peroxidase Antibody 0 - 34 IU/mL   97 (H)   (H): Data is abnormally high    Assessment and Plan    Diagnoses and all orders for this visit:    1. Anti-TPO antibodies present (Primary)    Prior available labs were reviewed with the patient.  Patient had elevated thyroid peroxidase antibodies on testing from December 2024.  Available thyroid function testing from 2023 to present has been normal and grossly stable.  We discussed that TPO antibodies are a risk factor but not a guarantee of future thyroid dysfunction.  We discussed recommendation for repeat thyroid function testing annually, sooner if there is a concerning symptomatic change.  Symptoms of hypothyroidism were reviewed.  Patient voiced understanding.  Will attempt to obtain prior records from [place].  Patient does report that other providers have had difficulty obtaining records, she states that if [facility] will not provide records she will obtain these the next time she is in [place].  Patient will contact the clinic in the interim between visits with any concerning clinical changes.  We discussed that annual screening could be completed with this office or with primary care.  Patient voiced preference to follow-up in this office.    Return in about 1 year (around 2/14/2026). The patient was instructed to contact the clinic with any interval questions or concerns.    Electronically signed by: Rea Larson MD     Dictated Utilizing Dragon Dictation

## 2025-02-14 NOTE — TELEPHONE ENCOUNTER
----- Message from Rea Larson sent at 2/14/2025  2:57 PM EST -----  Please request prior thyroid function testing, thyroid ultrasound from the [facility] in [facility].  Patient signed release as she was sleeping visit.

## 2025-02-17 DIAGNOSIS — R92.8 ABNORMAL MAMMOGRAM: ICD-10-CM

## 2025-03-05 ENCOUNTER — TELEPHONE (OUTPATIENT)
Dept: FAMILY MEDICINE CLINIC | Facility: CLINIC | Age: 65
End: 2025-03-05
Payer: OTHER GOVERNMENT

## 2025-03-06 ENCOUNTER — OFFICE VISIT (OUTPATIENT)
Dept: FAMILY MEDICINE CLINIC | Facility: CLINIC | Age: 65
End: 2025-03-06
Payer: OTHER GOVERNMENT

## 2025-03-06 VITALS
DIASTOLIC BLOOD PRESSURE: 76 MMHG | OXYGEN SATURATION: 99 % | HEART RATE: 64 BPM | WEIGHT: 125 LBS | HEIGHT: 63 IN | BODY MASS INDEX: 22.15 KG/M2 | SYSTOLIC BLOOD PRESSURE: 128 MMHG

## 2025-03-06 DIAGNOSIS — M81.0 AGE-RELATED OSTEOPOROSIS WITHOUT CURRENT PATHOLOGICAL FRACTURE: Primary | ICD-10-CM

## 2025-03-06 PROCEDURE — 99213 OFFICE O/P EST LOW 20 MIN: CPT | Performed by: STUDENT IN AN ORGANIZED HEALTH CARE EDUCATION/TRAINING PROGRAM

## 2025-03-06 NOTE — PROGRESS NOTES
"Chief Complaint  Follow-up (Pt wants to talk about hormone therapy to help with osteoporosis.)    Subjective          Sandee Aguilar presents to [facility]  History of Present Illness    Patient presents to the office for discussion of osteoporosis.     She states that she is concerned about taking the fosamax 2/2 to possible side effects. She states that she has not started this medication, but did pick it up. She has not been on it before. She would like to talk about other alternatives if possible.       Objective   Vital Signs:   /76   Pulse 64   Ht 160 cm (63\")   Wt 56.7 kg (125 lb)   SpO2 99%   BMI 22.14 kg/m²     Body mass index is 22.14 kg/m².    Review of Systems    Past History:  Medical History: has a past medical history of Colon polyp (2021), Headache, and Osteopenia (12/2024).   Surgical History: has a past surgical history that includes Dilation and curettage of uterus; Breast biopsy; Vascular surgery; and Colonoscopy (2022).   Family History: family history includes Alcohol abuse in her sister and son; Miscarriages / Stillbirths in her daughter and daughter.   Social History: reports that she has never smoked. She has never used smokeless tobacco. She reports current alcohol use of about 1.0 standard drink of alcohol per week. She reports that she does not use drugs.      Current Outpatient Medications:     Bacillus Coagulans-Inulin (Probiotic) 1-250 BILLION-MG capsule, Take  by mouth., Disp: , Rfl:     estradiol (VAGIFEM) 10 MCG tablet vaginal tablet, Insert 1 tablet into the vagina 2 (Two) Times a Week., Disp: 25 tablet, Rfl: 3    famotidine (PEPCID) 40 MG tablet, Take 1 tablet by mouth Daily., Disp: 90 tablet, Rfl: 1    Ginkgo Biloba 40 MG tablet, Take  by mouth., Disp: , Rfl:     magnesium chloride ER 64 MG DR tablet, Take  by mouth Daily., Disp: , Rfl:     multivitamin with minerals (MULTIVITAMIN ADULT PO), Take 1 tablet by mouth Daily., Disp: , " Rfl:     TURMERIC PO, Take  by mouth., Disp: , Rfl:     Allergies: Sulfa antibiotics    Physical Exam  Constitutional:       General: She is not in acute distress.     Appearance: She is not ill-appearing or toxic-appearing.   HENT:      Head: Normocephalic and atraumatic.   Pulmonary:      Effort: Pulmonary effort is normal. No respiratory distress.   Musculoskeletal:      Right lower leg: No edema.      Left lower leg: No edema.   Neurological:      General: No focal deficit present.      Mental Status: She is alert and oriented to person, place, and time.   Psychiatric:         Mood and Affect: Mood normal.         Thought Content: Thought content normal.          Result Review :                   Assessment and Plan    Diagnoses and all orders for this visit:    1. Age-related osteoporosis without current pathological fracture (Primary)    Discussed with patient at length that with her history and symptoms at this time we should hold off on doing HRT for osteoporosis first line. I recommended again fosamax and discussed side effects and how to take it appropriately.     She states that she will try it and she was advised that she should call if she has any side effects from the medication she should reach out and we will look into prolia or reclast. She is agreeable to this.     Discussed limitations to virtual visit and patient was agreeable to continue. Discussed that because of the limitations of minimal physical exam that if there is interval worsening they should present to the office for either curbside visit, or to the emergency department for further evaluation and definitive management. Patient was agreeable to this.    Mode of Visit: Video  Location of patient: home  Location of provider: [facility]  You have chosen to receive care through a telehealth visit.  Does the patient consent to use a video/audio connection for your medical care today? Yes  The visit included audio and video interaction. No  technical issues occurred during this visit.     I spent 24 minutes caring for Sandee on this date of service. This time includes time spent by me in the following activities:preparing for the visit, reviewing tests, performing a medically appropriate examination and/or evaluation , counseling and educating the patient/family/caregiver, and documenting information in the medical record  Follow Up   No follow-ups on file.  Patient was given instructions and counseling regarding her condition or for health maintenance advice. Please see specific information pulled into the AVS if appropriate.     Cece Finch, DO

## 2025-05-22 ENCOUNTER — OFFICE VISIT (OUTPATIENT)
Dept: FAMILY MEDICINE CLINIC | Facility: CLINIC | Age: 65
End: 2025-05-22
Payer: OTHER GOVERNMENT

## 2025-05-22 VITALS
OXYGEN SATURATION: 98 % | HEART RATE: 80 BPM | HEIGHT: 63 IN | SYSTOLIC BLOOD PRESSURE: 162 MMHG | DIASTOLIC BLOOD PRESSURE: 88 MMHG | WEIGHT: 126 LBS | BODY MASS INDEX: 22.32 KG/M2

## 2025-05-22 DIAGNOSIS — K40.90 NON-RECURRENT UNILATERAL INGUINAL HERNIA WITHOUT OBSTRUCTION OR GANGRENE: Primary | ICD-10-CM

## 2025-05-22 PROCEDURE — 99213 OFFICE O/P EST LOW 20 MIN: CPT | Performed by: STUDENT IN AN ORGANIZED HEALTH CARE EDUCATION/TRAINING PROGRAM

## 2025-05-22 NOTE — PROGRESS NOTES
"Chief Complaint  Hernia (Pt is concerned about possible hernia. It's painful, kept her up last night.)    Subjective          Sandee Aguilar presents to [facility]  History of Present Illness    Patient is here for evaluation of hernia.     She states that it has been present for several years, but she has had a \"buldge\" in the area for the past year. She states that previously it has not hurt but he recently it has been more bothersome to her.  Patient states that last night it was an 8 out of 10 pain, and she is here today for further evaluation.    Objective   Vital Signs:   /88   Pulse 80   Ht 160 cm (63\")   Wt 57.2 kg (126 lb)   SpO2 98%   BMI 22.32 kg/m²     Body mass index is 22.32 kg/m².    Review of Systems    Past History:  Medical History: has a past medical history of Colon polyp (2021), Headache, and Osteopenia (12/2024).   Surgical History: has a past surgical history that includes Dilation and curettage of uterus; Breast biopsy; Vascular surgery; and Colonoscopy (2022).   Family History: family history includes Alcohol abuse in her sister and son; Miscarriages / Stillbirths in her daughter and daughter.   Social History: reports that she has never smoked. She has never used smokeless tobacco. She reports current alcohol use of about 1.0 standard drink of alcohol per week. She reports that she does not use drugs.      Current Outpatient Medications:     Bacillus Coagulans-Inulin (Probiotic) 1-250 BILLION-MG capsule, Take  by mouth., Disp: , Rfl:     estradiol (VAGIFEM) 10 MCG tablet vaginal tablet, Insert 1 tablet into the vagina 2 (Two) Times a Week., Disp: 25 tablet, Rfl: 3    famotidine (PEPCID) 40 MG tablet, Take 1 tablet by mouth Daily., Disp: 90 tablet, Rfl: 1    Ginkgo Biloba 40 MG tablet, Take  by mouth., Disp: , Rfl:     magnesium chloride ER 64 MG DR tablet, Take  by mouth Daily., Disp: , Rfl:     multivitamin with minerals (MULTIVITAMIN ADULT " PO), Take 1 tablet by mouth Daily., Disp: , Rfl:     TURMERIC PO, Take  by mouth., Disp: , Rfl:     Allergies: Sulfa antibiotics    Physical Exam  Constitutional:       General: She is not in acute distress.     Appearance: She is not ill-appearing or toxic-appearing.   HENT:      Head: Normocephalic and atraumatic.   Cardiovascular:      Rate and Rhythm: Normal rate and regular rhythm.      Heart sounds: No murmur heard.  Pulmonary:      Effort: Pulmonary effort is normal. No respiratory distress.   Abdominal:      Hernia: A hernia (Right inguinal with herniation of bowel. Easily reduced with palpation.) is present.   Neurological:      General: No focal deficit present.      Mental Status: She is alert and oriented to person, place, and time.   Psychiatric:         Mood and Affect: Mood normal.         Thought Content: Thought content normal.          Result Review :                   Assessment and Plan    Diagnoses and all orders for this visit:    1. Non-recurrent unilateral inguinal hernia without obstruction or gangrene (Primary)  -     Ambulatory Referral to General Surgery    Will make referral to general surgery for further evaluation of management for this.  Advised patient of red flags including inability to reduce the hernia on her own.  Advised that if she has been unable to reduce the hernia and is having severe pain she needs to be evaluated in the emergency department.  She is agreeable to this.        Follow Up   No follow-ups on file.  Patient was given instructions and counseling regarding her condition or for health maintenance advice. Please see specific information pulled into the AVS if appropriate.     Cece Finch, DO